# Patient Record
(demographics unavailable — no encounter records)

---

## 2024-10-11 NOTE — CONSULT LETTER
[Dear  ___] : Dear  [unfilled], [Consult Letter:] : I had the pleasure of evaluating your patient, [unfilled]. [Please see my note below.] : Please see my note below. [Consult Closing:] : Thank you very much for allowing me to participate in the care of this patient.  If you have any questions, please do not hesitate to contact me. [Sincerely,] : Sincerely, [FreeTextEntry3] : Wil Lopez MD, FCCP, D. ABSM Pulmonary and Sleep Medicine [facility] at [place]

## 2024-10-11 NOTE — REASON FOR VISIT
[Initial Evaluation] : an initial evaluation [Shortness of Breath] : shortness of Breath [Sleep Apnea] : sleep apnea [FreeTextEntry2] : morbid obesity, prior covid infection.

## 2024-10-11 NOTE — REVIEW OF SYSTEMS
[EDS: ESS=____] : daytime somnolence: ESS=[unfilled] [Fatigue] : fatigue [Nasal Congestion] : nasal congestion [Snoring] : snoring [Witnessed Apneas] : witnessed apnea [Shortness Of Breath] : shortness of breath [Edema] : ~T edema was present [Obesity] : obesity [A.M. Headache] : headache present upon awakening [Heartburn] : heartburn [Difficulty Maintaining Sleep] : difficulty maintaining sleep [Negative] : Psychiatric [Unusual Sleep Behavior] : no unusual sleep behavior

## 2024-10-11 NOTE — END OF VISIT
[Time Spent: ___ minutes] : I have spent [unfilled] minutes of time on the encounter which excludes teaching and separately reported services. [TextEntry] : Discussed with pt at length regarding DINESH, obesity, soboe, prior Covid infection; reviewed w/u with pt as above.

## 2024-10-11 NOTE — PHYSICAL EXAM
[General Appearance - Well Developed] : well developed [Normal Conjunctiva] : the conjunctiva exhibited no abnormalities [Low Lying Soft Palate] : low lying soft palate [Enlarged Base of the Tongue] : enlargement of the base of the tongue [Neck Appearance] : the appearance of the neck was normal [Neck Cervical Mass (___cm)] : no neck mass was observed [Heart Rate And Rhythm] : heart rate was normal and rhythm regular [] : no respiratory distress [Respiration, Rhythm And Depth] : normal respiratory rhythm and effort [Exaggerated Use Of Accessory Muscles For Inspiration] : no accessory muscle use [Auscultation Breath Sounds / Voice Sounds] : lungs were clear to auscultation bilaterally [Nail Clubbing] : no clubbing of the fingernails [Cyanosis, Localized] : no localized cyanosis [No Focal Deficits] : no focal deficits [Oriented To Time, Place, And Person] : oriented to person, place, and time [FreeTextEntry1] : No abnormalities.

## 2024-10-11 NOTE — HISTORY OF PRESENT ILLNESS
[Excessive Daytime Sleepiness] : excessive daytime sleepiness [Witnessed Gasping During Sleep] : witnessed gasping during sleep [Unrefreshing Sleep] : unrefreshing sleep [Sleepy When Sedentary] : sleepy when sedentary [Initial Evaluation] : an initial evaluation of [Excess Weight] : excess weight [Currently Experiencing] : The patient is currently experiencing symptoms. [Dyspnea] : dyspnea [Obstructive Sleep Apnea] : obstructive sleep apnea [Snoring] : snoring [Witnessed Apneas] : witnessed sleep apnea [Frequent Nocturnal Awakening] : frequent nocturnal awakening [Awakes Unrefreshed] : awakening unrefreshed [Awakes with Headache] : headache upon awakening [Awakening With Dry Mouth] : awakening with dry mouth [Daytime Somnolence] : daytime somnolence [Morning Headaches] : morning headaches [None] : The patient is not currently being treated for this problem [FreeTextEntry1] : Never smoker. S/p Covid infection in 2021. Reports persistent loss of smell and intermittent cough for which she follows with Dr. Sullivan and is undergoing pulm w/u. S/p CHAPO in 2022 and has gained significant weight since. [ESS] : 8

## 2024-10-11 NOTE — ASSESSMENT
[FreeTextEntry1] :  #1. Will schedule lung function testing in near future to assess lung function per DB. #2. The patient does not appear to require chronic BD therapy at this time but further w/u per DB. #3. Diet and exercise for weight loss. #4. SOBOE is likely at least somewhat related to weight or deconditioning. #5. 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 #6. Replace PAP equipment as needed; ordered 10/11/24. #7. Further pulm w/u per DB. #8. S/p Covid vaccines and boosters. S/p Covid infection. #9. F/u in 8 weeks with compliance and with Dr. Sullivan as scheduled.   The patient expressed understanding and agreement with the above recommendations/plan and accepts responsibility to be compliant with recommended testing, therapies, and f/u visits. All relevant questions and concerns were addressed.

## 2024-11-19 NOTE — ASSESSMENT
[FreeTextEntry1] : Ms. Blandon is a 48 year old woman who presents today for ongoing evaluation of shortness of breath.  Her lab work was unremarkable save for an allergy to dust mites, and she unfortunately did not  Symbicort to trial it - this was re-sent today. Within the confines of the PFTs detailed above, everything appears to be well within normal limits, however. I do think at least some degree of her breathing issue is related to weight, and she is awaiting insurance approval to see bariatrics.  Otherwise, she was encouraged to trial alternative masks for her CPAP and I'll see her back in 6 months.

## 2024-11-19 NOTE — HISTORY OF PRESENT ILLNESS
[TextBox_4] : Ms. Blandon is a 48 year old woman who presents today for further evaluation of shortness of breath.  She noted having some cough since she caught COVID in 2021 which comes and goes, and also had been having some phlegm and back pain with left arm pain.  In terms of her breathing, she notes that she can walk around 3-4 blocks before needing to stop for shortness of breath, has some issues with walking upstairs, has some issues with sweeping or mopping, and sometimes the issues come on randomly. 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 No mold or water damage at home but lives in a basement at the moment. She is a lifelong never smoker and is from [place] [facility] originally but came here at 21, no wood burning or coal in the house.  After our initial appointment, we had ordered a sleep study, which returned with severe sleep apnea for which she has now seen Dr. Lopez. She notes feeling very tired after walking for long periods of time or very fast, and notes feeling dizzy in the mornings after wearing the CPAP mask - she stopped using the CPAP machine as she thought that might be making her dizzy, and the dizziness went away after the second day of not using it.  PFTs 11/19/2024: FEV1 2,12/81%, FVC 2.04/93%, ratio 96, DLCO 16.88/93%. Unable to reliably perform maneuvers, lung volume was incomplete.

## 2024-11-19 NOTE — PHYSICAL EXAM
[No Acute Distress] : no acute distress [III] : Mallampati Class: III [Normal Rate/Rhythm] : normal rate/rhythm [Normal S1, S2] : normal s1, s2 [No Resp Distress] : no resp distress [Clear to Auscultation Bilaterally] : clear to auscultation bilaterally [No Clubbing] : no clubbing [No Edema] : no edema [Oriented x3] : oriented x3 [Normal Affect] : normal affect [TextBox_54] : No overt murmurs

## 2024-11-19 NOTE — REVIEW OF SYSTEMS
[Fever] : no fever [Chills] : no chills [Cough] : cough [Dyspnea] : dyspnea [SOB on Exertion] : sob on exertion [GERD] : no gerd [Abdominal Pain] : no abdominal pain [Nocturia] : no nocturia [Dysuria] : no dysuria [Headache] : no headache [Dizziness] : no dizziness

## 2024-11-19 NOTE — HISTORY OF PRESENT ILLNESS
[TextBox_4] : Ms. Blandon is a 48 year old woman who presents today for further evaluation of shortness of breath.  She noted having some cough since she caught COVID in 2021 which comes and goes, and also had been having some phlegm and back pain with left arm pain.  In terms of her breathing, she notes that she can walk around 3-4 blocks before needing to stop for shortness of breath, has some issues with walking upstairs, has some issues with sweeping or mopping, and sometimes the issues come on randomly. 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 No mold or water damage at home but lives in a basement at the moment. She is a lifelong never smoker and is from [facility] originally but came here at 21, no wood burning or coal in the house.  After our initial appointment, we had ordered a sleep study, which returned with severe sleep apnea for which she has now seen Dr. Lopez. She notes feeling very tired after walking for long periods of time or very fast, and notes feeling dizzy in the mornings after wearing the CPAP mask - she stopped using the CPAP machine as she thought that might be making her dizzy, and the dizziness went away after the second day of not using it.  PFTs 11/19/2024: FEV1 2,12/81%, FVC 2.04/93%, ratio 96, DLCO 16.88/93%. Unable to reliably perform maneuvers, lung volume was incomplete.

## 2025-02-20 NOTE — REASON FOR VISIT
[Follow-Up] : a follow-up visit [Sleep Apnea] : sleep apnea [Shortness of Breath] : shortness of breath [Pre-op Risk Stratification] : pre-op risk stratification [Obesity] : obesity [Ad Hoc ] : provided by an ad hoc  [TextBox_44] : prior covid infection. [Interpreters_FullName] : Courtney [Interpreters_Relationshiptopatient] : LPN [TWNoteComboBox1] : [place]

## 2025-02-20 NOTE — DISCUSSION/SUMMARY
[FreeTextEntry1] :  #1. Lung function testing have been normal. #2. The patient does not appear to require chronic BD therapy at this time. She was briefly on Symbicort but now feels better off chronic BD therapy. #3. Diet and exercise for weight loss. #4. SOBOE is likely at least somewhat related to weight or deconditioning. #5. 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 Residual AHI normalized to 2.9 when she is able to use her APAP therapy. Encouraged at least 70% compliance. #6. Replace PAP equipment as needed; ordered 10/11/24. Pt to contact [facility] for mask options. #7. S/p Covid vaccines and boosters. S/p Covid infection. #8. F/u in 4-6 weeks with compliance and CXR. #9. Provided pt's preop CXR is unremarkable, there is no pulmonary contraindication for the patient's upcoming bariatric surgery. I would recommend that CPAP at 15 cm of water should be available to use post-op and with sleep. I would also recommend post op incentive spirometry, GI/DVT prophylaxis as needed, early ambulation as able, and adequate pain control. Would recommend that O2 saturation should be monitored during and after the procedure.   The patient expressed understanding and agreement with the above recommendations/plan and accepts responsibility to be compliant with recommended testing, therapies, and f/u visits. All relevant questions and concerns were addressed.

## 2025-02-20 NOTE — REASON FOR VISIT
[Follow-Up] : a follow-up visit [Sleep Apnea] : sleep apnea [Shortness of Breath] : shortness of breath [Pre-op Risk Stratification] : pre-op risk stratification [Obesity] : obesity [Ad Hoc ] : provided by an ad hoc  [TextBox_44] : prior covid infection. [Interpreters_FullName] : Courtney [Interpreters_Relationshiptopatient] : LPN [TWNoteComboBox1] : [place]

## 2025-02-20 NOTE — PROCEDURE
[FreeTextEntry1] : PFTs 11/19/24 with normal maryse and DLCO. [place] 2/20/25 - normal and without significant change.

## 2025-02-20 NOTE — CONSULT LETTER
[Dear  ___] : Dear  [unfilled], [Consult Letter:] : I had the pleasure of evaluating your patient, [unfilled]. [Please see my note below.] : Please see my note below. [Consult Closing:] : Thank you very much for allowing me to participate in the care of this patient.  If you have any questions, please do not hesitate to contact me. [Sincerely,] : Sincerely, [FreeTextEntry3] : Wil Lopez MD, FCCP, D. ABSM Pulmonary and Sleep Medicine [facility] Sleep [facility] at [place]

## 2025-02-20 NOTE — PROCEDURE
[FreeTextEntry1] : PFTs 11/19/24 with normal maryse and DLCO. [place] 2/20/25 - normal and without significant change.

## 2025-02-20 NOTE — DISCUSSION/SUMMARY
[FreeTextEntry1] :  #1. Lung function testing have been normal. #2. The patient does not appear to require chronic BD therapy at this time. She was briefly on Symbicort but now feels better off chronic BD therapy. #3. Diet and exercise for weight loss. #4. SOBOE is likely at least somewhat related to weight or deconditioning. #5. 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 Residual AHI normalized to 2.9 when she is able to use her APAP therapy. Encouraged at least 70% compliance. #6. Replace PAP equipment as needed; ordered 10/11/24. Pt to contact [facility] for mask options. #7. S/p Covid vaccines and boosters. S/p Covid infection. #8. F/u in 4-6 weeks with compliance and CXR. #9. Provided pt's preop CXR is unremarkable, there is no pulmonary contraindication for the patient's upcoming bariatric surgery. I would recommend that CPAP at 15 cm of water should be available to use post-op and with sleep. I would also recommend post op incentive spirometry, GI/DVT prophylaxis as needed, early ambulation as able, and adequate pain control. Would recommend that O2 saturation should be monitored during and after the procedure.   The patient expressed understanding and agreement with the above recommendations/plan and accepts responsibility to be compliant with recommended testing, therapies, and f/u visits. All relevant questions and concerns were addressed.

## 2025-02-20 NOTE — CONSULT LETTER
[Dear  ___] : Dear  [unfilled], [Consult Letter:] : I had the pleasure of evaluating your patient, [unfilled]. [Please see my note below.] : Please see my note below. [Consult Closing:] : Thank you very much for allowing me to participate in the care of this patient.  If you have any questions, please do not hesitate to contact me. [Sincerely,] : Sincerely, [FreeTextEntry3] : Wil Lopez MD, FCCP, D. ABSM Pulmonary and Sleep Medicine [facility] Pulmonary [facility] Sleep Medicine at [place]

## 2025-02-20 NOTE — HISTORY OF PRESENT ILLNESS
[Excessive Daytime Sleepiness] : excessive daytime sleepiness [Witnessed Gasping During Sleep] : witnessed gasping during sleep [Unrefreshing Sleep] : unrefreshing sleep [Sleepy When Sedentary] : sleepy when sedentary [Morning Headaches] : morning headaches [None] : The patient is not currently being treated for this problem [Initial Evaluation] : an initial evaluation of [Excess Weight] : excess weight [Currently Experiencing] : The patient is currently experiencing symptoms. [Dyspnea] : dyspnea [Obstructive Sleep Apnea] : obstructive sleep apnea [Snoring] : snoring [Witnessed Apneas] : witnessed sleep apnea [Frequent Nocturnal Awakening] : frequent nocturnal awakening [Awakes Unrefreshed] : awakening unrefreshed [Awakes with Headache] : headache upon awakening [Awakening With Dry Mouth] : awakening with dry mouth [Daytime Somnolence] : daytime somnolence [FreeTextEntry1] : Never smoker. S/p Covid infection in 2021. Reports persistent loss of smell and intermittent cough for which she was followed with Dr. Sullivan and was undergoing pulm w/u though is now much better and near baseline. S/p CHAPO in 2022 and has gained significant weight since. The patient presents for pulmonary evaluation prior to bariatric surgery. 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 Residual AHI normalized to 2.9 when she is able to use her APAP therapy. [ESS] : 8

## 2025-03-17 NOTE — CONSULT LETTER
[Dear  ___] : Dear  [unfilled], [Consult Letter:] : I had the pleasure of evaluating your patient, [unfilled]. [Please see my note below.] : Please see my note below. [Consult Closing:] : Thank you very much for allowing me to participate in the care of this patient.  If you have any questions, please do not hesitate to contact me. [Sincerely,] : Sincerely, [FreeTextEntry3] : Wil Lopez MD, FCCP, D. ABSM Pulmonary and Sleep Medicine [facility] Pulmonary and Sleep Medicine at [place]

## 2025-03-17 NOTE — HISTORY OF PRESENT ILLNESS
[Excessive Daytime Sleepiness] : excessive daytime sleepiness [Witnessed Gasping During Sleep] : witnessed gasping during sleep [Unrefreshing Sleep] : unrefreshing sleep [Sleepy When Sedentary] : sleepy when sedentary [Morning Headaches] : morning headaches [None] : The patient is not currently being treated for this problem [Excess Weight] : excess weight [Currently Experiencing] : The patient is currently experiencing symptoms. [Dyspnea] : dyspnea [Obstructive Sleep Apnea] : obstructive sleep apnea [Snoring] : snoring [Witnessed Apneas] : witnessed sleep apnea [Frequent Nocturnal Awakening] : frequent nocturnal awakening [Awakes Unrefreshed] : awakening unrefreshed [Awakes with Headache] : headache upon awakening [Awakening With Dry Mouth] : awakening with dry mouth [Daytime Somnolence] : daytime somnolence [Follow-Up - Routine Clinic] : a routine clinic follow-up of [ESS] : 8 [TextBox_4] : Never smoker. S/p Covid infection in 2021. Reports persistent loss of smell and intermittent cough for which she was followed with Dr. Sullivan and was undergoing pulm w/u though is now much better and near baseline. S/p CHAPO in 2022 and has gained significant weight since. The patient presents for pulmonary evaluation prior to bariatric surgery. 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 Residual AHI normalized to 2.9 when she is able to use her APAP therapy but did not use enough so had to return it. She did feels she was getting used to it and feeling better with it when she returned it. Will order APAP again and HST if needed.

## 2025-03-17 NOTE — REASON FOR VISIT
[Follow-Up] : a follow-up visit [Sleep Apnea] : sleep apnea [Shortness of Breath] : shortness of breath [Pre-op Risk Stratification] : pre-op risk stratification [Obesity] : obesity [Ad Hoc ] : provided by an ad hoc  [TextBox_44] : prior covid infection. [Interpreters_FullName] : Gali [Interpreters_Relationshiptopatient] : MA [TWNoteComboBox1] : Ethiopian

## 2025-03-17 NOTE — DISCUSSION/SUMMARY
[FreeTextEntry1] :  #1. Lung function testing have been normal. #2. The patient does not appear to require chronic BD therapy at this time. She was briefly on Symbicort but now feels better off chronic BD therapy. #3. Diet and exercise for weight loss. #4. SOBOE is likely at least somewhat related to weight or deconditioning. #5. 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 Residual AHI normalized to 2.9 when she is able to use her APAP therapy. Encouraged at least 70% compliance but had to return it for non-compliance. #6. Replace PAP equipment as needed; ordered new machine 3/17/25 but also ordered HST as pt may need a new study. #7. S/p Covid vaccines and boosters. S/p Covid infection. #8. F/u in 2 months with compliance and HST if needed. #9. Once pt restarts her APAP therapy, there would be no pulmonary contraindication for the patient's upcoming bariatric surgery. I would recommend that her APAP therapy or CPAP at 15 cm of water should be available to use post-op and with sleep. I would also recommend post op incentive spirometry, GI/DVT prophylaxis as needed, early ambulation as able, and adequate pain control. Would recommend that O2 saturation should be monitored during and after the procedure.   The patient expressed understanding and agreement with the above recommendations/plan and accepts responsibility to be compliant with recommended testing, therapies, and f/u visits. All relevant questions and concerns were addressed.

## 2025-03-17 NOTE — REASON FOR VISIT
[Follow-Up] : a follow-up visit [Sleep Apnea] : sleep apnea [Shortness of Breath] : shortness of breath [Pre-op Risk Stratification] : pre-op risk stratification [Obesity] : obesity [Ad Hoc ] : provided by an ad hoc  [TextBox_44] : prior covid infection. [Interpreters_FullName] : Gali [Interpreters_Relationshiptopatient] : MA [TWNoteComboBox1] : [place]

## 2025-03-17 NOTE — PROCEDURE
[FreeTextEntry1] : PFTs 11/19/24 with normal maryse and DLCO. [place] 2/20/25 - normal and without significant change.

## 2025-03-17 NOTE — RESULTS/DATA
[TextEntry] : Home PSG from 9/26/24 revealed severe DINESH with an AHI of 63. CXR 2/27/25 was clear per report.

## 2025-03-17 NOTE — CONSULT LETTER
[Dear  ___] : Dear  [unfilled], [Consult Letter:] : I had the pleasure of evaluating your patient, [unfilled]. [Please see my note below.] : Please see my note below. [Consult Closing:] : Thank you very much for allowing me to participate in the care of this patient.  If you have any questions, please do not hesitate to contact me. [Sincerely,] : Sincerely, [FreeTextEntry3] : Wil Lopez MD, FCCP, D. ABSM Pulmonary and Sleep Medicine [facility] Sleep [facility] at [place]

## 2025-03-17 NOTE — PROCEDURE
[FreeTextEntry1] : PFTs 11/19/24 with normal maryse and DLCO. [place] 2/20/25 - normal and without significant change.

## 2025-06-06 NOTE — DISCUSSION/SUMMARY
[FreeTextEntry1] :  #1. Lung function testing have been normal. #2. The patient does not appear to require chronic BD therapy at this time. She was briefly on Symbicort but now feels better off chronic BD therapy. #3. Diet and exercise for weight loss. #4. SOBOE is likely at least somewhat related to weight or deconditioning. #5. Pt with severe DINESH with an AHI of 63 and then 76.9 and was on APAP therapy but having difficulty with her mask with increased leaks at times. Residual AHI normalized to 2.9 when she is able to use her APAP therapy. She had to return it for non-compliance. She is willing to try APAP therapy again given clinical improvement during the S/N PSG. She will be started on APAP 12-20 cm of water but could consider lower pressures if she feels the pressure is too high as she did reasonably well at 11 cm of water. #6. Replace PAP equipment as needed; ordered new machine 3/17/25 and will evaluate response. #7. S/p Covid vaccines and boosters. S/p Covid infection. #8. F/u in 1-2 months with compliance. #9. Once pt restarts her APAP therapy, there would be no pulmonary contraindication for the patient's upcoming bariatric surgery. I would recommend that her APAP therapy or CPAP at 15 cm of water should be available to use post-op and with sleep though more detailed pressure recommendations will be available once she starts using her new APAP therapy and compliance can be obtained. I would also recommend post op incentive spirometry, GI/DVT prophylaxis as needed, early ambulation as able, and adequate pain control. Would recommend that O2 saturation should be monitored during and after the procedure.   The patient expressed understanding and agreement with the above recommendations/plan and accepts responsibility to be compliant with recommended testing, therapies, and f/u visits. All relevant questions and concerns were addressed.

## 2025-06-06 NOTE — HISTORY OF PRESENT ILLNESS
[Excessive Daytime Sleepiness] : excessive daytime sleepiness [Witnessed Gasping During Sleep] : witnessed gasping during sleep [Unrefreshing Sleep] : unrefreshing sleep [Sleepy When Sedentary] : sleepy when sedentary [Morning Headaches] : morning headaches [None] : The patient is not currently being treated for this problem [Follow-Up - Routine Clinic] : a routine clinic follow-up of [Excess Weight] : excess weight [Currently Experiencing] : The patient is currently experiencing symptoms. [Dyspnea] : dyspnea [Obstructive Sleep Apnea] : obstructive sleep apnea [Snoring] : snoring [Witnessed Apneas] : witnessed sleep apnea [Frequent Nocturnal Awakening] : frequent nocturnal awakening [Awakes Unrefreshed] : awakening unrefreshed [Awakes with Headache] : headache upon awakening [Awakening With Dry Mouth] : awakening with dry mouth [Daytime Somnolence] : daytime somnolence [ESS] : 8 [TextBox_4] : Never smoker. S/p Covid infection in 2021. Reports persistent loss of smell and intermittent cough for which she was followed with Dr. Sullivan and was undergoing pulm w/u though is now much better and near baseline. She c/o mild AM cough which could be from PNDS. S/p CHAPO in 2022 and has gained significant weight since.  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 Residual AHI normalized to 2.9 when she is able to use her APAP therapy but did not use enough so had to return it. She did feels she was getting used to it and feeling better with it when she returned it. She is now willing to use APAP therapy after her S/N PSG. The patient presents for pulmonary evaluation prior to bariatric surgery.

## 2025-06-06 NOTE — CONSULT LETTER
[Dear  ___] : Dear  [unfilled], [Consult Letter:] : I had the pleasure of evaluating your patient, [unfilled]. [Please see my note below.] : Please see my note below. [Consult Closing:] : Thank you very much for allowing me to participate in the care of this patient.  If you have any questions, please do not hesitate to contact me. [Sincerely,] : Sincerely, [FreeTextEntry3] : Wil Lopez MD, FCCP, D. [facility] at [place]

## 2025-06-06 NOTE — REVIEW OF SYSTEMS
[Fatigue] : fatigue [SOB on Exertion] : sob on exertion [Headache] : headache [Obesity] : obesity [Negative] : Psychiatric

## 2025-06-06 NOTE — PROCEDURE
[FreeTextEntry1] : PFTs 11/19/24 with normal maryse and DLCO. [place] 2/20/25 - normal and without significant change.

## 2025-06-06 NOTE — RESULTS/DATA
[TextEntry] : Home PSG from 9/26/24 revealed severe DINESH with an AHI of 63. CXR 2/27/25 was clear per report. S/N PSG from 5/23/25 revealed severe DINESH with an AHI of 76.9 with a near therapeutic pressure of 11 cm of water though will be started on APAP 12-20 cm of water per report.

## 2025-06-06 NOTE — REASON FOR VISIT
[Follow-Up] : a follow-up visit [Sleep Apnea] : sleep apnea [Shortness of Breath] : shortness of breath [Pre-op Risk Stratification] : pre-op risk stratification [Obesity] : obesity [Ad Hoc ] : provided by an ad hoc  [TextBox_44] : prior covid infection. [Interpreters_FullName] : Fili [Interpreters_Relationshiptopatient] : MA [TWNoteComboBox1] : [place]

## 2025-07-16 NOTE — CONSULT LETTER
[Dear  ___] : Dear  [unfilled], [Consult Letter:] : I had the pleasure of evaluating your patient, [unfilled]. [Please see my note below.] : Please see my note below. [Consult Closing:] : Thank you very much for allowing me to participate in the care of this patient.  If you have any questions, please do not hesitate to contact me. [Sincerely,] : Sincerely, [FreeTextEntry3] : Wil Lopez MD, FCCP, D. [facility] at [place]

## 2025-07-16 NOTE — REASON FOR VISIT
[Follow-Up] : a follow-up visit [Sleep Apnea] : sleep apnea [Shortness of Breath] : shortness of breath [Pre-op Risk Stratification] : pre-op risk stratification [Obesity] : obesity [TextBox_44] : prior covid infection.

## 2025-07-16 NOTE — PROCEDURE
[FreeTextEntry1] : PFTs 11/19/24 with normal maryse and DLCO. [place] 2/20/25 - normal and without significant change.

## 2025-07-16 NOTE — RESULTS/DATA
[TextEntry] : Home PSG from 9/26/24 revealed severe DINESH with an AHI of 63. CXR 2/27/25 was clear per report. S/N PSG from 5/23/25 revealed severe DINESH with an AHI of 76.9 with a near therapeutic pressure of 11 cm of water though will be started on APAP 12-20 cm of water per report. The patient's overall compliance is 30% with a >4hr compliance of 17% on autoCPAP with a median and max pressure of 14.8 and 18.4 cm of water, respectively with a residual AHI of 2.7 which is normal

## 2025-07-16 NOTE — DISCUSSION/SUMMARY
[FreeTextEntry1] :  #1. Lung function tests were normal. #2. The patient does not appear to require chronic BD therapy at this time. She was briefly on Symbicort but now feels better off chronic BD therapy. Albuterol as needed for now. #3. Diet and exercise for weight loss. Pt planning bariatric surgery. #4. SOBOE is likely at least somewhat related to weight or deconditioning. #5. Pt with severe DINESH with an AHI of 63 and then 76.9 and was on APAP therapy but having difficulty with her mask with increased leaks at times. Residual AHI normalized to 2.9 when she is able to use her APAP therapy. She had to return it for non-compliance. She is willing to try APAP therapy again given clinical improvement during the S/N PSG. She is on APAP 12-20 cm of water. #6. Replace PAP equipment as needed; ordered new machine 3/17/25 and will evaluate response. #7. S/p Covid vaccines and boosters. S/p Covid infection. #8. F/u in 1-2 months with compliance. #9. Given pt is now trying to use her APAP therapy, there is no pulmonary contraindication for the patient's upcoming bariatric surgery. I would recommend that her APAP therapy or CPAP at 18 cm of water should be available to use post-op and with sleep. I would also recommend post op incentive spirometry, GI/DVT prophylaxis as needed, early ambulation as able, and adequate pain control. Would recommend that O2 saturation should be monitored during and after the procedure.   The patient expressed understanding and agreement with the above recommendations/plan and accepts responsibility to be compliant with recommended testing, therapies, and f/u visits. All relevant questions and concerns were addressed.